# Patient Record
(demographics unavailable — no encounter records)

---

## 2024-12-09 NOTE — IMAGING
[There are no fractures, subluxations or dislocations. No significant abnormalities are seen] : There are no fractures, subluxations or dislocations. No significant abnormalities are seen [Bilateral] : knee bilaterally [AP] : anteroposterior [Lateral] : lateral [Degenerative change] : Degenerative change

## 2024-12-09 NOTE — ASSESSMENT
[FreeTextEntry1] : will inject left knee with cortisone, do course of PT Needs to see Rheumatologist- pain in multiple joints, out of proportion to xray findings

## 2024-12-09 NOTE — PHYSICAL EXAM
[Bilateral] : knee bilaterally [NL (0)] : extension 0 degrees [5___] : hamstring 5[unfilled]/5 [Equivocal] : equivocal Gokul [] : negative Lachmann [TWNoteComboBox7] : flexion 125 degrees

## 2024-12-09 NOTE — HISTORY OF PRESENT ILLNESS
[de-identified] : 12/09/2024: Patient is here for bilateral knee pain that began in early 2024, and bilateral shoulder pain that began in 2020. Has been going to pain management. Last visit was in 2023, had CSI at time with relief. Denies n/t. Pain does not radiate. Right is worse than left. Notes swelling to posterior of left knee (worse than right). Experiences clicking. Denies buckling/locking. No prior injury to either. Went to PCP, got XR. Tried Naprosyn with no relief.